# Patient Record
Sex: FEMALE | Race: WHITE | NOT HISPANIC OR LATINO | URBAN - METROPOLITAN AREA
[De-identification: names, ages, dates, MRNs, and addresses within clinical notes are randomized per-mention and may not be internally consistent; named-entity substitution may affect disease eponyms.]

---

## 2020-06-29 ENCOUNTER — OFFICE VISIT (OUTPATIENT)
Dept: URBAN - METROPOLITAN AREA CLINIC 13 | Facility: CLINIC | Age: 25
End: 2020-06-29

## 2020-06-30 ENCOUNTER — OFFICE VISIT (OUTPATIENT)
Dept: URBAN - METROPOLITAN AREA CLINIC 113 | Facility: CLINIC | Age: 25
End: 2020-06-30

## 2020-07-25 ENCOUNTER — TELEPHONE ENCOUNTER (OUTPATIENT)
Dept: URBAN - METROPOLITAN AREA CLINIC 13 | Facility: CLINIC | Age: 25
End: 2020-07-25

## 2020-07-25 RX ORDER — NORETHINDRONE ACETATE AND ETHINYL ESTRADIOL 1MG-20(21)
KIT ORAL
Qty: 84 | Refills: 0 | OUTPATIENT
Start: 2020-02-06 | End: 2020-07-30

## 2020-07-26 ENCOUNTER — TELEPHONE ENCOUNTER (OUTPATIENT)
Dept: URBAN - METROPOLITAN AREA CLINIC 13 | Facility: CLINIC | Age: 25
End: 2020-07-26

## 2020-07-26 RX ORDER — NORETHINDRONE ACETATE AND ETHINYL ESTRADIOL 1.5; 3 MG/1; UG/1
TAKE 1 TABLET DAILY FOR 21 DAYS, THEN 7 TABLET-FREE DAYS; REPEAT TABLET ORAL
Refills: 0 | Status: ACTIVE | COMMUNITY

## 2020-07-26 RX ORDER — NARATRIPTAN HYDROCHLORIDE 1 MG/1
TAKE 1 TABLET TWICE DAILY TABLET, FILM COATED ORAL
Refills: 0 | Status: ACTIVE | COMMUNITY

## 2020-07-26 RX ORDER — NORETHINDRONE ACETATE AND ETHINYL ESTRADIOL 1MG-20(21)
TAKE ONE TABLET BY MOUTH ONE TIME DAILY KIT ORAL
Qty: 84 | Refills: 0 | Status: ACTIVE | COMMUNITY
Start: 2019-11-06

## 2020-07-30 ENCOUNTER — OFFICE VISIT (OUTPATIENT)
Dept: URBAN - METROPOLITAN AREA CLINIC 113 | Facility: CLINIC | Age: 25
End: 2020-07-30

## 2020-10-13 ENCOUNTER — OFFICE VISIT (OUTPATIENT)
Dept: URBAN - METROPOLITAN AREA CLINIC 113 | Facility: CLINIC | Age: 25
End: 2020-10-13

## 2021-06-16 ENCOUNTER — WEB ENCOUNTER (OUTPATIENT)
Dept: URBAN - METROPOLITAN AREA CLINIC 107 | Facility: CLINIC | Age: 26
End: 2021-06-16

## 2021-06-16 ENCOUNTER — OFFICE VISIT (OUTPATIENT)
Dept: URBAN - METROPOLITAN AREA CLINIC 107 | Facility: CLINIC | Age: 26
End: 2021-06-16
Payer: COMMERCIAL

## 2021-06-16 VITALS
RESPIRATION RATE: 18 BRPM | TEMPERATURE: 98.7 F | BODY MASS INDEX: 23.05 KG/M2 | HEIGHT: 64 IN | DIASTOLIC BLOOD PRESSURE: 78 MMHG | SYSTOLIC BLOOD PRESSURE: 104 MMHG | WEIGHT: 135 LBS | HEART RATE: 93 BPM

## 2021-06-16 DIAGNOSIS — R10.84 GENERALIZED ABDOMINAL PAIN: ICD-10-CM

## 2021-06-16 DIAGNOSIS — R19.7 DIARRHEA: ICD-10-CM

## 2021-06-16 PROCEDURE — 99214 OFFICE O/P EST MOD 30 MIN: CPT | Performed by: INTERNAL MEDICINE

## 2021-06-16 RX ORDER — NORETHINDRONE ACETATE AND ETHINYL ESTRADIOL 1; 20 MG/1; UG/1
TAKE 1 TABLET BY ORAL ROUTE ONCE DAILY TABLET ORAL 1
Qty: 0 | Refills: 0 | Status: ACTIVE | COMMUNITY
Start: 1900-01-01

## 2021-06-16 RX ORDER — NARATRIPTAN HYDROCHLORIDE 1 MG/1
TAKE 1 TABLET TWICE DAILY TABLET, FILM COATED ORAL
Refills: 0 | Status: ON HOLD | COMMUNITY

## 2021-06-16 RX ORDER — NORETHINDRONE ACETATE AND ETHINYL ESTRADIOL 1.5; 3 MG/1; UG/1
TAKE 1 TABLET DAILY FOR 21 DAYS, THEN 7 TABLET-FREE DAYS; REPEAT TABLET ORAL
Refills: 0 | Status: ON HOLD | COMMUNITY

## 2021-06-16 RX ORDER — PROPRANOLOL HYDROCHLORIDE 10 MG/1
1 TABLET TABLET ORAL ONCE A DAY
Status: ACTIVE | COMMUNITY

## 2021-06-16 RX ORDER — NORETHINDRONE ACETATE AND ETHINYL ESTRADIOL 1MG-20(21)
TAKE ONE TABLET BY MOUTH ONE TIME DAILY KIT ORAL
Qty: 84 | Refills: 0 | Status: ON HOLD | COMMUNITY
Start: 2019-11-06

## 2021-06-16 RX ORDER — INDOMETHACIN 25 MG/1
CAPSULE ORAL
Qty: 0 | Refills: 0 | Status: ON HOLD | COMMUNITY
Start: 1900-01-01

## 2021-06-16 RX ORDER — CIPROFLOXACIN 500 MG/1
1 TABLET TABLET, FILM COATED ORAL TWICE A DAY
Qty: 14 TABLET | Refills: 0 | OUTPATIENT
Start: 2021-06-16 | End: 2021-06-23

## 2021-06-16 RX ORDER — DICYCLOMINE HYDROCHLORIDE 10 MG/1
1 CAPSULE CAPSULE ORAL
Qty: 60 | Refills: 1 | OUTPATIENT
Start: 2021-06-16 | End: 2021-08-15

## 2021-06-16 RX ORDER — DIPHENOXYLATE HYDROCHLORIDE AND ATROPINE SULFATE 2.5; .025 MG/1; MG/1
1 TABLET AS NEEDED TABLET ORAL
Qty: 60 | Refills: 1 | OUTPATIENT
Start: 2021-06-16

## 2021-06-16 RX ORDER — ONDANSETRON 4 MG/1
PLACE 1 TABLET ON THE TONGUE AND ALLOW TO DISSOLVE, EVERY 4-6 HOURS AS NEEDED FOR NAUSEA TABLET, ORALLY DISINTEGRATING ORAL
Qty: 45 TABLET | Refills: 1 | OUTPATIENT
Start: 2021-06-16

## 2021-06-16 RX ORDER — ESCITALOPRAM 20 MG/1
1 TABLET TABLET, FILM COATED ORAL ONCE A DAY
Refills: 0 | Status: ACTIVE | COMMUNITY
Start: 1900-01-01

## 2021-06-16 RX ORDER — NARATRIPTAN 1 MG/1
TAKE 1 TABLET (1 MG) BY ORAL ROUTE ONCE MAY REPEAT AFTER 4 HOURS TABLET ORAL
Qty: 1 | Refills: 0 | Status: ON HOLD | COMMUNITY
Start: 1900-01-01

## 2021-06-16 NOTE — HPI-TODAY'S VISIT:
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 She was last seen 7/30/20 for evaluation of abdominal pain and diarrhea, which was initially attributed to irritable bowel syndrome. Her symptoms had improved with initiation of a daily fiber supplement, probiotic, and prophylactic Imodium, however, she continued to experience stool urgency and mucus per rectum. A diagnostic colonoscopy was recommended to exclude inflammatory bowel disease. I do not see where this was ever scheduled. Her symptoms subsided about a month after the last visit, and have been maintained with diet since that time (primarily dairy avoidance). 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 Within the last 24 hours, she has experienced a flare of significant lower abdominal cramping and diarrhea, with up to 15 loose, urgent stools. She has taken Imodium 3x times with minimial relief. She never picked up the dicyclomine. She denies nausea, vomiting or blood per rectum. No recent antibiotic use or medication changes.

## 2021-06-18 ENCOUNTER — TELEPHONE ENCOUNTER (OUTPATIENT)
Dept: URBAN - METROPOLITAN AREA CLINIC 113 | Facility: CLINIC | Age: 26
End: 2021-06-18

## 2021-10-29 ENCOUNTER — OFFICE VISIT (OUTPATIENT)
Dept: URBAN - METROPOLITAN AREA CLINIC 113 | Facility: CLINIC | Age: 26
End: 2021-10-29
Payer: COMMERCIAL

## 2021-10-29 ENCOUNTER — TELEPHONE ENCOUNTER (OUTPATIENT)
Dept: URBAN - METROPOLITAN AREA CLINIC 113 | Facility: CLINIC | Age: 26
End: 2021-10-29

## 2021-10-29 ENCOUNTER — LAB OUTSIDE AN ENCOUNTER (OUTPATIENT)
Dept: URBAN - METROPOLITAN AREA CLINIC 113 | Facility: CLINIC | Age: 26
End: 2021-10-29

## 2021-10-29 VITALS — BODY MASS INDEX: 22.2 KG/M2 | RESPIRATION RATE: 18 BRPM | HEIGHT: 64 IN | WEIGHT: 130 LBS

## 2021-10-29 DIAGNOSIS — E73.9 LACTOSE INTOLERANCE: ICD-10-CM

## 2021-10-29 DIAGNOSIS — R10.84 GENERALIZED ABDOMINAL PAIN: ICD-10-CM

## 2021-10-29 DIAGNOSIS — R19.7 DIARRHEA: ICD-10-CM

## 2021-10-29 PROCEDURE — 99212 OFFICE O/P EST SF 10 MIN: CPT | Performed by: INTERNAL MEDICINE

## 2021-10-29 RX ORDER — NORETHINDRONE ACETATE AND ETHINYL ESTRADIOL 1; 20 MG/1; UG/1
TAKE 1 TABLET BY ORAL ROUTE ONCE DAILY TABLET ORAL 1
Qty: 0 | Refills: 0 | Status: ACTIVE | COMMUNITY
Start: 1900-01-01

## 2021-10-29 RX ORDER — NARATRIPTAN HYDROCHLORIDE 1 MG/1
TAKE 1 TABLET TWICE DAILY TABLET, FILM COATED ORAL
Refills: 0 | Status: DISCONTINUED | COMMUNITY

## 2021-10-29 RX ORDER — NORETHINDRONE ACETATE AND ETHINYL ESTRADIOL 1.5; 3 MG/1; UG/1
TAKE 1 TABLET DAILY FOR 21 DAYS, THEN 7 TABLET-FREE DAYS; REPEAT TABLET ORAL
Refills: 0 | Status: DISCONTINUED | COMMUNITY

## 2021-10-29 RX ORDER — PROPRANOLOL HYDROCHLORIDE 10 MG/1
1 TABLET TABLET ORAL ONCE A DAY
Status: ACTIVE | COMMUNITY

## 2021-10-29 RX ORDER — DIPHENOXYLATE HYDROCHLORIDE AND ATROPINE SULFATE 2.5; .025 MG/1; MG/1
1 TABLET AS NEEDED TABLET ORAL
Qty: 60 | Refills: 1 | Status: ACTIVE | COMMUNITY
Start: 2021-06-16

## 2021-10-29 RX ORDER — SODIUM, POTASSIUM,MAG SULFATES 17.5-3.13G
354 ML SOLUTION, RECONSTITUTED, ORAL ORAL ONCE
Qty: 354 ML | Refills: 0 | OUTPATIENT
Start: 2021-10-29 | End: 2021-10-30

## 2021-10-29 RX ORDER — ESCITALOPRAM 20 MG/1
1 TABLET TABLET, FILM COATED ORAL ONCE A DAY
Refills: 0 | Status: ACTIVE | COMMUNITY
Start: 1900-01-01

## 2021-10-29 RX ORDER — INDOMETHACIN 25 MG/1
CAPSULE ORAL
Qty: 0 | Refills: 0 | Status: DISCONTINUED | COMMUNITY
Start: 1900-01-01

## 2021-10-29 RX ORDER — NARATRIPTAN 1 MG/1
TAKE 1 TABLET (1 MG) BY ORAL ROUTE ONCE MAY REPEAT AFTER 4 HOURS TABLET ORAL
Qty: 1 | Refills: 0 | Status: DISCONTINUED | COMMUNITY
Start: 1900-01-01

## 2021-10-29 RX ORDER — ONDANSETRON 4 MG/1
PLACE 1 TABLET ON THE TONGUE AND ALLOW TO DISSOLVE, EVERY 4-6 HOURS AS NEEDED FOR NAUSEA TABLET, ORALLY DISINTEGRATING ORAL
Qty: 45 TABLET | Refills: 1 | Status: DISCONTINUED | COMMUNITY
Start: 2021-06-16

## 2021-10-29 RX ORDER — NORETHINDRONE ACETATE AND ETHINYL ESTRADIOL 1MG-20(21)
TAKE ONE TABLET BY MOUTH ONE TIME DAILY KIT ORAL
Qty: 84 | Refills: 0 | Status: DISCONTINUED | COMMUNITY
Start: 2019-11-06

## 2021-10-29 NOTE — HPI-TODAY'S VISIT:
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 She was last seen 7/30/20 for evaluation of abdominal pain and diarrhea, which was initially attributed to irritable bowel syndrome. Her symptoms had improved with initiation of a daily fiber supplement, probiotic, and prophylactic Imodium, however, she continued to experience stool urgency and mucus per rectum. A diagnostic colonoscopy was recommended to exclude inflammatory bowel disease. I do not see where this was ever scheduled. Her symptoms subsided about a month after the last visit, and have been maintained with diet since that time (primarily dairy avoidance). 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 Within the last 24 hours, she has experienced a flare of significant lower abdominal cramping and diarrhea, with up to 15 loose, urgent stools. She has taken Imodium 3x times with minimial relief. She never picked up the dicyclomine. She denies nausea, vomiting or blood per rectum. No recent antibiotic use or medication changes. She did have stool studies done in the past and these were negative for Giardia, and ova and parasites.  Stool for PCR was negative for enteric pathogens.  An IgA level was 117, TTG antibodies were negative.  CRP was 10.3. Blood work on 10/15/2021 revealed a hemoglobin of 13.2 WBC of 7.3 platelet count of 277,000.  Sodium is 138 potassium 4.3 BUN 9 creatinine 0.86.  AST 14, ALT 13, alkaline phosphatase 108 total bili 0.5. Since that flare she had earlier this year she continues to have loose stools about 2 to 3/day.  She uses Lomotil and that seems to help some there is been no blood or fever.  She gets abdominal cramps with her symptoms.  She definitely is sensitive to dairy and is been trying to avoid dairy products.  She lost some weight with her previous flare and has not regained it.  Currently no nausea or vomiting or heartburn.

## 2021-11-08 ENCOUNTER — TELEPHONE ENCOUNTER (OUTPATIENT)
Dept: URBAN - METROPOLITAN AREA CLINIC 113 | Facility: CLINIC | Age: 26
End: 2021-11-08

## 2021-11-11 ENCOUNTER — TELEPHONE ENCOUNTER (OUTPATIENT)
Dept: URBAN - METROPOLITAN AREA CLINIC 113 | Facility: CLINIC | Age: 26
End: 2021-11-11

## 2021-12-08 ENCOUNTER — TELEPHONE ENCOUNTER (OUTPATIENT)
Dept: URBAN - METROPOLITAN AREA CLINIC 113 | Facility: CLINIC | Age: 26
End: 2021-12-08

## 2021-12-10 ENCOUNTER — OFFICE VISIT (OUTPATIENT)
Dept: URBAN - METROPOLITAN AREA SURGERY CENTER 25 | Facility: SURGERY CENTER | Age: 26
End: 2021-12-10

## 2021-12-10 ENCOUNTER — OFFICE VISIT (OUTPATIENT)
Dept: URBAN - METROPOLITAN AREA SURGERY CENTER 25 | Facility: SURGERY CENTER | Age: 26
End: 2021-12-10
Payer: COMMERCIAL

## 2021-12-10 DIAGNOSIS — K52.89 MICROSCOPIC COLITIS: ICD-10-CM

## 2021-12-10 DIAGNOSIS — D12.0 ADENOMA OF CECUM: ICD-10-CM

## 2021-12-10 PROCEDURE — G8907 PT DOC NO EVENTS ON DISCHARG: HCPCS | Performed by: INTERNAL MEDICINE

## 2021-12-10 PROCEDURE — 45385 COLONOSCOPY W/LESION REMOVAL: CPT | Performed by: INTERNAL MEDICINE

## 2021-12-10 PROCEDURE — 45380 COLONOSCOPY AND BIOPSY: CPT | Performed by: INTERNAL MEDICINE

## 2021-12-10 RX ORDER — PROPRANOLOL HYDROCHLORIDE 10 MG/1
1 TABLET TABLET ORAL ONCE A DAY
Status: ACTIVE | COMMUNITY

## 2021-12-10 RX ORDER — NORETHINDRONE ACETATE AND ETHINYL ESTRADIOL 1; 20 MG/1; UG/1
TAKE 1 TABLET BY ORAL ROUTE ONCE DAILY TABLET ORAL 1
Qty: 0 | Refills: 0 | Status: ACTIVE | COMMUNITY
Start: 1900-01-01

## 2021-12-10 RX ORDER — ESCITALOPRAM 20 MG/1
1 TABLET TABLET, FILM COATED ORAL ONCE A DAY
Refills: 0 | Status: ACTIVE | COMMUNITY
Start: 1900-01-01

## 2021-12-10 RX ORDER — DIPHENOXYLATE HYDROCHLORIDE AND ATROPINE SULFATE 2.5; .025 MG/1; MG/1
1 TABLET AS NEEDED TABLET ORAL
Qty: 60 | Refills: 1 | Status: ACTIVE | COMMUNITY
Start: 2021-06-16

## 2022-01-14 ENCOUNTER — WEB ENCOUNTER (OUTPATIENT)
Dept: URBAN - METROPOLITAN AREA CLINIC 113 | Facility: CLINIC | Age: 27
End: 2022-01-14

## 2022-01-14 ENCOUNTER — DASHBOARD ENCOUNTERS (OUTPATIENT)
Age: 27
End: 2022-01-14

## 2022-01-14 ENCOUNTER — OFFICE VISIT (OUTPATIENT)
Dept: URBAN - METROPOLITAN AREA CLINIC 113 | Facility: CLINIC | Age: 27
End: 2022-01-14
Payer: COMMERCIAL

## 2022-01-14 VITALS
DIASTOLIC BLOOD PRESSURE: 73 MMHG | TEMPERATURE: 97.8 F | SYSTOLIC BLOOD PRESSURE: 130 MMHG | WEIGHT: 138 LBS | HEIGHT: 64 IN | BODY MASS INDEX: 23.56 KG/M2 | HEART RATE: 83 BPM

## 2022-01-14 DIAGNOSIS — R19.7 DIARRHEA: ICD-10-CM

## 2022-01-14 DIAGNOSIS — E73.9 LACTOSE INTOLERANCE: ICD-10-CM

## 2022-01-14 DIAGNOSIS — D12.6 SERRATED ADENOMA OF COLON: ICD-10-CM

## 2022-01-14 PROBLEM — 428054006: Status: ACTIVE | Noted: 2022-01-14

## 2022-01-14 PROBLEM — 782415009: Status: ACTIVE | Noted: 2021-10-29

## 2022-01-14 PROBLEM — 102614006 GENERALIZED ABDOMINAL PAIN: Status: ACTIVE | Noted: 2021-10-29

## 2022-01-14 PROCEDURE — 99213 OFFICE O/P EST LOW 20 MIN: CPT | Performed by: INTERNAL MEDICINE

## 2022-01-14 RX ORDER — ESCITALOPRAM 20 MG/1
1 TABLET TABLET, FILM COATED ORAL ONCE A DAY
Refills: 0 | Status: ACTIVE | COMMUNITY
Start: 1900-01-01

## 2022-01-14 RX ORDER — DIPHENOXYLATE HYDROCHLORIDE AND ATROPINE SULFATE 2.5; .025 MG/1; MG/1
1 TABLET AS NEEDED TABLET ORAL
Qty: 60 | Refills: 1 | Status: ACTIVE | COMMUNITY
Start: 2021-06-16

## 2022-01-14 RX ORDER — NORETHINDRONE ACETATE AND ETHINYL ESTRADIOL 1; 20 MG/1; UG/1
TAKE 1 TABLET BY ORAL ROUTE ONCE DAILY TABLET ORAL 1
Qty: 0 | Refills: 0 | Status: ACTIVE | COMMUNITY
Start: 1900-01-01

## 2022-01-14 RX ORDER — PROPRANOLOL HYDROCHLORIDE 10 MG/1
1 TABLET TABLET ORAL ONCE A DAY
Status: ACTIVE | COMMUNITY

## 2022-01-14 NOTE — PHYSICAL EXAM NECK/THYROID:
normal appearance , without tenderness upon palpation , no deformities , trachea midline , Thyroid normal size , no thyroid nodules , no masses , no JVD , thyroid nontender
normal...

## 2022-01-14 NOTE — HPI-TODAY'S VISIT:
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 She was last seen 7/30/20 for evaluation of abdominal pain and diarrhea, which was initially attributed to irritable bowel syndrome. Her symptoms had improved with initiation of a daily fiber supplement, probiotic, and prophylactic Imodium, however, she continued to experience stool urgency and mucus per rectum. A diagnostic colonoscopy was recommended to exclude inflammatory bowel disease. I do not see where this was ever scheduled. Her symptoms subsided about a month after the last visit, and have been maintained with diet since that time (primarily dairy avoidance). 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 Within the last 24 hours, she has experienced a flare of significant lower abdominal cramping and diarrhea, with up to 15 loose, urgent stools. She has taken Imodium 3x times with minimial relief. She never picked up the dicyclomine. She denies nausea, vomiting or blood per rectum. No recent antibiotic use or medication changes. She did have stool studies done in the past and these were negative for Giardia, and ova and parasites.  Stool for PCR was negative for enteric pathogens.  An IgA level was 117, TTG antibodies were negative.  CRP was 10.3. Blood work on 10/15/2021 revealed a hemoglobin of 13.2 WBC of 7.3 platelet count of 277,000.  Sodium is 138 potassium 4.3 BUN 9 creatinine 0.86.  AST 14, ALT 13, alkaline phosphatase 108 total bili 0.5. Since that flare she had earlier this year she continues to have loose stools about 2 to 3/day.  She uses Lomotil and that seems to help some there is been no blood or fever.  She gets abdominal cramps with her symptoms.  She definitely is sensitive to dairy and is been trying to avoid dairy products.  She lost some weight with her previous flare and has not regained it.  Currently no nausea or vomiting or heartburn. Colonoscopy on 12/10/2021 revealed normal terminal ileum.  Biopsies revealed no pathologic abnormality.  There was a 10 mm polyp in the cecum that was removed.  This was a sessile serrated adenoma.  There was normal mucosa throughout the colon.  Random biopsies revealed focal mild acute colitis.  This showed superficial lymphocytes neutrophils and eosinophils consistent with a acute self-limited colitis. She is doing quite well.  She says she has one to 3 stools that are loose per day.  His been no blood or fever or pain.  No nausea or vomiting.  She has no heartburn or dysphagia.  There's been no bright red blood per rectum or any melena.  Overall she says she feels well.  She has gained weight since her last appointment.  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